# Patient Record
Sex: MALE | Race: WHITE | NOT HISPANIC OR LATINO | Employment: FULL TIME | URBAN - NONMETROPOLITAN AREA
[De-identification: names, ages, dates, MRNs, and addresses within clinical notes are randomized per-mention and may not be internally consistent; named-entity substitution may affect disease eponyms.]

---

## 2017-12-23 ENCOUNTER — HOSPITAL ENCOUNTER (EMERGENCY)
Facility: HOSPITAL | Age: 27
Discharge: HOME | End: 2017-12-23
Admitting: NURSE PRACTITIONER

## 2017-12-23 ENCOUNTER — APPOINTMENT (OUTPATIENT)
Dept: CT IMAGING | Facility: HOSPITAL | Age: 27
End: 2017-12-23

## 2017-12-23 VITALS
HEART RATE: 78 BPM | BODY MASS INDEX: 44.3 KG/M2 | WEIGHT: 250 LBS | DIASTOLIC BLOOD PRESSURE: 73 MMHG | HEIGHT: 63 IN | SYSTOLIC BLOOD PRESSURE: 136 MMHG | RESPIRATION RATE: 20 BRPM | OXYGEN SATURATION: 96 % | TEMPERATURE: 97.2 F

## 2017-12-23 DIAGNOSIS — N20.0 RIGHT KIDNEY STONE: Primary | ICD-10-CM

## 2017-12-23 LAB
ALBUMIN SERPL-MCNC: 4.3 G/DL (ref 3.5–5)
ALBUMIN/GLOB SERPL: 1.3 G/DL (ref 1.1–2.5)
ALP SERPL-CCNC: 83 U/L (ref 24–120)
ALT SERPL W P-5'-P-CCNC: 79 U/L (ref 0–54)
AMYLASE SERPL-CCNC: 34 U/L (ref 30–110)
ANION GAP SERPL CALCULATED.3IONS-SCNC: 16 MMOL/L (ref 4–13)
AST SERPL-CCNC: 42 U/L (ref 7–45)
BACTERIA UR QL AUTO: ABNORMAL /HPF
BASOPHILS # BLD AUTO: 0.03 10*3/MM3 (ref 0–0.2)
BASOPHILS NFR BLD AUTO: 0.4 % (ref 0–2)
BILIRUB SERPL-MCNC: 0.4 MG/DL (ref 0.1–1)
BILIRUB UR QL STRIP: NEGATIVE
BUN BLD-MCNC: 16 MG/DL (ref 5–21)
BUN/CREAT SERPL: 16.2 (ref 7–25)
CALCIUM SPEC-SCNC: 8.6 MG/DL (ref 8.4–10.4)
CHLORIDE SERPL-SCNC: 106 MMOL/L (ref 98–110)
CLARITY UR: CLEAR
CO2 SERPL-SCNC: 22 MMOL/L (ref 24–31)
COLOR UR: ABNORMAL
CREAT BLD-MCNC: 0.99 MG/DL (ref 0.5–1.4)
DEPRECATED RDW RBC AUTO: 39.3 FL (ref 40–54)
EOSINOPHIL # BLD AUTO: 0.15 10*3/MM3 (ref 0–0.7)
EOSINOPHIL NFR BLD AUTO: 1.9 % (ref 0–4)
ERYTHROCYTE [DISTWIDTH] IN BLOOD BY AUTOMATED COUNT: 13.1 % (ref 12–15)
GFR SERPL CREATININE-BSD FRML MDRD: 110 ML/MIN/1.73
GFR SERPL CREATININE-BSD FRML MDRD: 91 ML/MIN/1.73
GLOBULIN UR ELPH-MCNC: 3.3 GM/DL
GLUCOSE BLD-MCNC: 152 MG/DL (ref 70–100)
GLUCOSE UR STRIP-MCNC: NEGATIVE MG/DL
HCT VFR BLD AUTO: 41 % (ref 40–52)
HGB BLD-MCNC: 14 G/DL (ref 14–18)
HGB UR QL STRIP.AUTO: ABNORMAL
HOLD SPECIMEN: NORMAL
HOLD SPECIMEN: NORMAL
HYALINE CASTS UR QL AUTO: ABNORMAL /LPF
IMM GRANULOCYTES # BLD: 0.01 10*3/MM3 (ref 0–0.03)
IMM GRANULOCYTES NFR BLD: 0.1 % (ref 0–5)
KETONES UR QL STRIP: ABNORMAL
LEUKOCYTE ESTERASE UR QL STRIP.AUTO: NEGATIVE
LIPASE SERPL-CCNC: 63 U/L (ref 23–203)
LYMPHOCYTES # BLD AUTO: 1.59 10*3/MM3 (ref 0.72–4.86)
LYMPHOCYTES NFR BLD AUTO: 20.1 % (ref 15–45)
MCH RBC QN AUTO: 28.7 PG (ref 28–32)
MCHC RBC AUTO-ENTMCNC: 34.1 G/DL (ref 33–36)
MCV RBC AUTO: 84 FL (ref 82–95)
MONOCYTES # BLD AUTO: 0.86 10*3/MM3 (ref 0.19–1.3)
MONOCYTES NFR BLD AUTO: 10.8 % (ref 4–12)
NEUTROPHILS # BLD AUTO: 5.29 10*3/MM3 (ref 1.87–8.4)
NEUTROPHILS NFR BLD AUTO: 66.7 % (ref 39–78)
NITRITE UR QL STRIP: NEGATIVE
PH UR STRIP.AUTO: <=5 [PH] (ref 5–8)
PLATELET # BLD AUTO: 258 10*3/MM3 (ref 130–400)
PMV BLD AUTO: 10.3 FL (ref 6–12)
POTASSIUM BLD-SCNC: 3.5 MMOL/L (ref 3.5–5.3)
PROT SERPL-MCNC: 7.6 G/DL (ref 6.3–8.7)
PROT UR QL STRIP: ABNORMAL
RBC # BLD AUTO: 4.88 10*6/MM3 (ref 4.8–5.9)
RBC # UR: ABNORMAL /HPF
REF LAB TEST METHOD: ABNORMAL
SODIUM BLD-SCNC: 144 MMOL/L (ref 135–145)
SP GR UR STRIP: >=1.03 (ref 1–1.03)
SQUAMOUS #/AREA URNS HPF: ABNORMAL /HPF
UROBILINOGEN UR QL STRIP: ABNORMAL
WBC NRBC COR # BLD: 7.93 10*3/MM3 (ref 4.8–10.8)
WBC UR QL AUTO: ABNORMAL /HPF
WHOLE BLOOD HOLD SPECIMEN: NORMAL
WHOLE BLOOD HOLD SPECIMEN: NORMAL

## 2017-12-23 PROCEDURE — 25010000002 HYDROMORPHONE PER 4 MG: Performed by: NURSE PRACTITIONER

## 2017-12-23 PROCEDURE — 83690 ASSAY OF LIPASE: CPT

## 2017-12-23 PROCEDURE — 25010000002 ONDANSETRON PER 1 MG

## 2017-12-23 PROCEDURE — 25010000002 KETOROLAC TROMETHAMINE PER 15 MG: Performed by: NURSE PRACTITIONER

## 2017-12-23 PROCEDURE — 96374 THER/PROPH/DIAG INJ IV PUSH: CPT

## 2017-12-23 PROCEDURE — 99283 EMERGENCY DEPT VISIT LOW MDM: CPT

## 2017-12-23 PROCEDURE — 85025 COMPLETE CBC W/AUTO DIFF WBC: CPT

## 2017-12-23 PROCEDURE — 96375 TX/PRO/DX INJ NEW DRUG ADDON: CPT

## 2017-12-23 PROCEDURE — 81001 URINALYSIS AUTO W/SCOPE: CPT | Performed by: NURSE PRACTITIONER

## 2017-12-23 PROCEDURE — 80053 COMPREHEN METABOLIC PANEL: CPT

## 2017-12-23 PROCEDURE — 82150 ASSAY OF AMYLASE: CPT

## 2017-12-23 PROCEDURE — 74176 CT ABD & PELVIS W/O CONTRAST: CPT

## 2017-12-23 RX ORDER — HYDROCODONE BITARTRATE AND ACETAMINOPHEN 7.5; 325 MG/1; MG/1
1 TABLET ORAL EVERY 6 HOURS PRN
Qty: 12 TABLET | Refills: 0 | Status: SHIPPED | OUTPATIENT
Start: 2017-12-23 | End: 2017-12-26

## 2017-12-23 RX ORDER — TAMSULOSIN HYDROCHLORIDE 0.4 MG/1
1 CAPSULE ORAL DAILY
Qty: 30 CAPSULE | Refills: 0 | Status: SHIPPED | OUTPATIENT
Start: 2017-12-23

## 2017-12-23 RX ORDER — KETOROLAC TROMETHAMINE 10 MG/1
10 TABLET, FILM COATED ORAL EVERY 6 HOURS PRN
Qty: 12 TABLET | Refills: 0 | Status: SHIPPED | OUTPATIENT
Start: 2017-12-23 | End: 2017-12-26

## 2017-12-23 RX ORDER — ONDANSETRON 2 MG/ML
8 INJECTION INTRAMUSCULAR; INTRAVENOUS ONCE
Status: COMPLETED | OUTPATIENT
Start: 2017-12-23 | End: 2017-12-23

## 2017-12-23 RX ORDER — KETOROLAC TROMETHAMINE 30 MG/ML
30 INJECTION, SOLUTION INTRAMUSCULAR; INTRAVENOUS EVERY 6 HOURS PRN
Status: DISCONTINUED | OUTPATIENT
Start: 2017-12-23 | End: 2017-12-23 | Stop reason: HOSPADM

## 2017-12-23 RX ORDER — ONDANSETRON 2 MG/ML
INJECTION INTRAMUSCULAR; INTRAVENOUS
Status: COMPLETED
Start: 2017-12-23 | End: 2017-12-23

## 2017-12-23 RX ADMIN — ONDANSETRON 8 MG: 2 INJECTION, SOLUTION INTRAMUSCULAR; INTRAVENOUS at 11:45

## 2017-12-23 RX ADMIN — KETOROLAC TROMETHAMINE 30 MG: 30 INJECTION, SOLUTION INTRAMUSCULAR at 12:51

## 2017-12-23 RX ADMIN — ONDANSETRON 8 MG: 2 INJECTION INTRAMUSCULAR; INTRAVENOUS at 11:45

## 2017-12-23 RX ADMIN — HYDROMORPHONE HYDROCHLORIDE 1 MG: 1 INJECTION, SOLUTION INTRAMUSCULAR; INTRAVENOUS; SUBCUTANEOUS at 11:59

## 2017-12-26 DIAGNOSIS — R10.9 FLANK PAIN: Primary | ICD-10-CM

## 2017-12-29 ENCOUNTER — OFFICE VISIT (OUTPATIENT)
Dept: UROLOGY | Facility: CLINIC | Age: 27
End: 2017-12-29

## 2017-12-29 ENCOUNTER — HOSPITAL ENCOUNTER (OUTPATIENT)
Dept: GENERAL RADIOLOGY | Facility: HOSPITAL | Age: 27
Discharge: HOME | End: 2017-12-29
Attending: UROLOGY | Admitting: UROLOGY

## 2017-12-29 VITALS — DIASTOLIC BLOOD PRESSURE: 66 MMHG | TEMPERATURE: 96.5 F | SYSTOLIC BLOOD PRESSURE: 115 MMHG | HEIGHT: 63 IN

## 2017-12-29 DIAGNOSIS — R10.9 FLANK PAIN: ICD-10-CM

## 2017-12-29 DIAGNOSIS — N20.1 RIGHT URETERAL STONE: Primary | ICD-10-CM

## 2017-12-29 LAB
BILIRUB BLD-MCNC: NEGATIVE MG/DL
CLARITY, POC: CLEAR
COLOR UR: YELLOW
GLUCOSE UR STRIP-MCNC: NEGATIVE MG/DL
KETONES UR QL: NEGATIVE
LEUKOCYTE EST, POC: NEGATIVE
NITRITE UR-MCNC: NEGATIVE MG/ML
PH UR: 5 [PH] (ref 5–8)
PROT UR STRIP-MCNC: ABNORMAL MG/DL
RBC # UR STRIP: NEGATIVE /UL
SP GR UR: 1.02 (ref 1–1.03)
UROBILINOGEN UR QL: NORMAL

## 2017-12-29 PROCEDURE — 74000 HC ABDOMEN KUB: CPT

## 2017-12-29 PROCEDURE — 81003 URINALYSIS AUTO W/O SCOPE: CPT | Performed by: UROLOGY

## 2017-12-29 PROCEDURE — 99204 OFFICE O/P NEW MOD 45 MIN: CPT | Performed by: UROLOGY

## 2017-12-29 RX ORDER — HYDROCODONE BITARTRATE AND ACETAMINOPHEN 7.5; 325 MG/1; MG/1
1 TABLET ORAL EVERY 6 HOURS PRN
COMMUNITY

## 2017-12-29 RX ORDER — KETOROLAC TROMETHAMINE 10 MG/1
10 TABLET, FILM COATED ORAL EVERY 6 HOURS PRN
COMMUNITY

## 2017-12-29 NOTE — PROGRESS NOTES
Mr. Jimenez is 27 y.o. male    Chief Complaint   Patient presents with   • Flank Pain       Flank Pain   This is a new problem. The current episode started in the past 7 days. The problem occurs constantly. The problem has been resolved since onset. Pain location: right flank. Radiates to: right lower pelvis. The pain is moderate. The pain is the same all the time. The symptoms are aggravated by position. Associated symptoms include abdominal pain and headaches. Pertinent negatives include no chest pain, dysuria or fever. Risk factors: no history of stones. He has tried analgesics and NSAIDs for the symptoms. The treatment provided significant relief.       The following portions of the patient's history were reviewed and updated as appropriate: allergies, current medications, past family history, past medical history, past social history, past surgical history and problem list.    Review of Systems   Constitutional: Negative for chills and fever.   HENT: Negative for congestion and sore throat.    Eyes: Negative for pain and itching.   Respiratory: Negative for cough and shortness of breath.    Cardiovascular: Negative for chest pain.   Gastrointestinal: Positive for abdominal pain. Negative for anal bleeding and blood in stool.   Endocrine: Negative for cold intolerance and heat intolerance.   Genitourinary: Negative for difficulty urinating, dysuria, flank pain, frequency, hematuria and urgency.   Musculoskeletal: Negative for back pain and neck pain.   Skin: Negative for color change and rash.   Allergic/Immunologic: Negative for food allergies.   Neurological: Positive for headaches. Negative for dizziness.   Hematological: Does not bruise/bleed easily.   Psychiatric/Behavioral: Negative for confusion and sleep disturbance.         Current Outpatient Prescriptions:   •  HYDROcodone-acetaminophen (NORCO) 7.5-325 MG per tablet, Take 1 tablet by mouth Every 6 (Six) Hours As Needed for Moderate Pain ., Disp: , Rfl:  "  •  ketorolac (TORADOL) 10 MG tablet, Take 10 mg by mouth Every 6 (Six) Hours As Needed for Moderate Pain ., Disp: , Rfl:   •  tamsulosin (FLOMAX) 0.4 MG capsule 24 hr capsule, Take 1 capsule by mouth Daily., Disp: 30 capsule, Rfl: 0    History reviewed. No pertinent past medical history.    History reviewed. No pertinent surgical history.    Social History     Social History   • Marital status: Single     Spouse name: N/A   • Number of children: N/A   • Years of education: N/A     Social History Main Topics   • Smoking status: Never Smoker   • Smokeless tobacco: Never Used   • Alcohol use No   • Drug use: None   • Sexual activity: Not Asked     Other Topics Concern   • None     Social History Narrative       Family History   Problem Relation Age of Onset   • No Known Problems Father    • No Known Problems Mother        Objective    /66  Temp 96.5 °F (35.8 °C)  Ht 160 cm (63\")    Physical Exam   Constitutional: He is oriented to person, place, and time. He appears well-developed and well-nourished. No distress.   HENT:   Head: Normocephalic and atraumatic.   Right Ear: External ear and ear canal normal.   Left Ear: External ear and ear canal normal.   Nose: No nasal deformity. No epistaxis.   Mouth/Throat: Oropharynx is clear and moist. Mucous membranes are not pale, not dry and not cyanotic. Normal dentition. No oropharyngeal exudate.   Neck: Trachea normal. No tracheal tenderness present. No tracheal deviation present. No thyroid mass and no thyromegaly present.   Pulmonary/Chest: Effort normal. No accessory muscle usage. No respiratory distress. Chest wall is not dull to percussion (No flatness or hyperresonance). He exhibits no mass and no tenderness.   On palpation, no tactile fremitus. All movements are symmetric. No intercostal retraction noted.    Abdominal: Soft. Normal appearance. He exhibits no distension and no mass. There is no hepatosplenomegaly. There is no tenderness. No hernia.   Rectal " examination or stool specimen is not indicated.    Musculoskeletal:   Normal gait and station. The spine, ribs, and pelvis are examined. No obvious misalignment or asymmetry. ROM is reasonable for age. No instability. No obvious atrophy, flaccidity or spasticity.    Lymphadenopathy:     He has no cervical adenopathy.        Right: No inguinal adenopathy present.        Left: No inguinal adenopathy present.   Neurological: He is alert and oriented to person, place, and time.   Skin: Skin is warm, dry and intact. No lesion and no rash noted. He is not diaphoretic. No cyanosis. No pallor. Nails show no clubbing.   On palpation, there were no induration, subcutaneous nodules, or tightening   Psychiatric: His speech is normal and behavior is normal. Judgment and thought content normal. His mood appears not anxious. His affect is not labile. He does not exhibit a depressed mood.   Vitals reviewed.      Admission on 12/23/2017, Discharged on 12/23/2017   Component Date Value Ref Range Status   • Glucose 12/23/2017 152* 70 - 100 mg/dL Final   • BUN 12/23/2017 16  5 - 21 mg/dL Final   • Creatinine 12/23/2017 0.99  0.50 - 1.40 mg/dL Final   • Sodium 12/23/2017 144  135 - 145 mmol/L Final   • Potassium 12/23/2017 3.5  3.5 - 5.3 mmol/L Final   • Chloride 12/23/2017 106  98 - 110 mmol/L Final   • CO2 12/23/2017 22.0* 24.0 - 31.0 mmol/L Final   • Calcium 12/23/2017 8.6  8.4 - 10.4 mg/dL Final   • Total Protein 12/23/2017 7.6  6.3 - 8.7 g/dL Final   • Albumin 12/23/2017 4.30  3.50 - 5.00 g/dL Final   • ALT (SGPT) 12/23/2017 79* 0 - 54 U/L Final   • AST (SGOT) 12/23/2017 42  7 - 45 U/L Final   • Alkaline Phosphatase 12/23/2017 83  24 - 120 U/L Final   • Total Bilirubin 12/23/2017 0.4  0.1 - 1.0 mg/dL Final   • eGFR Non  Amer 12/23/2017 91  >60 mL/min/1.73 Final   • eGFR   Amer 12/23/2017 110  >60 mL/min/1.73 Final   • Globulin 12/23/2017 3.3  gm/dL Final   • A/G Ratio 12/23/2017 1.3  1.1 - 2.5 g/dL Final   •  BUN/Creatinine Ratio 12/23/2017 16.2  7.0 - 25.0 Final   • Anion Gap 12/23/2017 16.0* 4.0 - 13.0 mmol/L Final   • Lipase 12/23/2017 63  23 - 203 U/L Final   • Amylase 12/23/2017 34  30 - 110 U/L Final   • WBC 12/23/2017 7.93  4.80 - 10.80 10*3/mm3 Final   • RBC 12/23/2017 4.88  4.80 - 5.90 10*6/mm3 Final   • Hemoglobin 12/23/2017 14.0  14.0 - 18.0 g/dL Final   • Hematocrit 12/23/2017 41.0  40.0 - 52.0 % Final   • MCV 12/23/2017 84.0  82.0 - 95.0 fL Final   • MCH 12/23/2017 28.7  28.0 - 32.0 pg Final   • MCHC 12/23/2017 34.1  33.0 - 36.0 g/dL Final   • RDW 12/23/2017 13.1  12.0 - 15.0 % Final   • RDW-SD 12/23/2017 39.3* 40.0 - 54.0 fl Final   • MPV 12/23/2017 10.3  6.0 - 12.0 fL Final   • Platelets 12/23/2017 258  130 - 400 10*3/mm3 Final   • Neutrophil % 12/23/2017 66.7  39.0 - 78.0 % Final   • Lymphocyte % 12/23/2017 20.1  15.0 - 45.0 % Final   • Monocyte % 12/23/2017 10.8  4.0 - 12.0 % Final   • Eosinophil % 12/23/2017 1.9  0.0 - 4.0 % Final   • Basophil % 12/23/2017 0.4  0.0 - 2.0 % Final   • Immature Grans % 12/23/2017 0.1  0.0 - 5.0 % Final   • Neutrophils, Absolute 12/23/2017 5.29  1.87 - 8.40 10*3/mm3 Final   • Lymphocytes, Absolute 12/23/2017 1.59  0.72 - 4.86 10*3/mm3 Final   • Monocytes, Absolute 12/23/2017 0.86  0.19 - 1.30 10*3/mm3 Final   • Eosinophils, Absolute 12/23/2017 0.15  0.00 - 0.70 10*3/mm3 Final   • Basophils, Absolute 12/23/2017 0.03  0.00 - 0.20 10*3/mm3 Final   • Immature Grans, Absolute 12/23/2017 0.01  0.00 - 0.03 10*3/mm3 Final   • Extra Tube 12/23/2017 hold for add-on   Final    Auto resulted   • Extra Tube 12/23/2017 Hold for add-ons.   Final    Auto resulted.   • Extra Tube 12/23/2017 hold for add-on   Final    Auto resulted   • Extra Tube 12/23/2017 Hold for add-ons.   Final    Auto resulted.   • Color, UA 12/23/2017 Dark Yellow* Yellow, Straw Final   • Appearance, UA 12/23/2017 Clear  Clear Final   • pH, UA 12/23/2017 <=5.0  5.0 - 8.0 Final   • Specific Gravity, UA 12/23/2017  >=1.030  1.005 - 1.030 Final   • Glucose, UA 12/23/2017 Negative  Negative Final   • Ketones, UA 12/23/2017 Trace* Negative Final   • Bilirubin, UA 12/23/2017 Negative  Negative Final   • Blood, UA 12/23/2017 Moderate (2+)* Negative Final   • Protein, UA 12/23/2017 Trace* Negative Final   • Leuk Esterase, UA 12/23/2017 Negative  Negative Final   • Nitrite, UA 12/23/2017 Negative  Negative Final   • Urobilinogen, UA 12/23/2017 1.0 E.U./dL  0.2 - 1.0 E.U./dL Final   • RBC, UA 12/23/2017 21-30* None Seen /HPF Final   • WBC, UA 12/23/2017 0-2* None Seen /HPF Final   • Bacteria, UA 12/23/2017 None Seen  None Seen /HPF Final   • Squamous Epithelial Cells, UA 12/23/2017 None Seen  None Seen, 0-2 /HPF Final   • Hyaline Casts, UA 12/23/2017 0-2  None Seen /LPF Final   • Methodology 12/23/2017 Automated Microscopy   Final       Results for orders placed or performed in visit on 12/29/17   POC Urinalysis Dipstick, Automated   Result Value Ref Range    Color Yellow Yellow, Straw, Dark Yellow, Brook    Clarity, UA Clear Clear    Glucose, UA Negative Negative, 1000 mg/dL (3+) mg/dL    Bilirubin Negative Negative    Ketones, UA Negative Negative    Specific Gravity  1.025 1.005 - 1.030    Blood, UA Negative Negative    pH, Urine 5.0 5.0 - 8.0    Protein, POC Trace (A) Negative mg/dL    Urobilinogen, UA Normal Normal    Leukocytes Negative Negative    Nitrite, UA Negative Negative     Assessment and Plan    Todd was seen today for flank pain.    Diagnoses and all orders for this visit:    Right ureteral stone  -     POC Urinalysis Dipstick, Automated  -     US Renal Bilateral; Future    Flank pain  I reviewed his labs and his CT scan from the emergency room visit.  CT scan revealed a 2 mm distal right ureteral stone.  Creatinine less than 1.  White blood cell count 7.9.  Urinalysis negative nitrite and leukocyte esterase.    Patient's pain has resolved.  No blood in his urinalysis today.  I do believe he likely passed the stone.   I would like to get a renal ultrasound next week to rule out a silent obstruction.  This is his first stone so I do not think he needs a full metabolic workup.  Today we discussed dietary recommendations for stone formers.  I will see him back next week.  He will call with any further issues with pain or any other questions.    CT independent review  The CT scan of the abdomen/pelvis done without contrast is available for me to review.  Treatment recommendations require an independent review.  First I scanned the liver, spleen, and bowel pattern.  The retroperitoneum including the major vessels and lymphatic packages are briefly reviewed.  This film as been reviewed by the radiologist to determine any non urologic abnormalities that are present.  The kidneys are closely inspected for size, symmetry, contour, parenchymal thickness, perinephric reaction, presence of calcifications, and intrarenal dilation of the collecting system.  The ureters are inspected for their course, caliber, and any calcifications.  The bladder is inspected for its thickness, size, and presence of any calcifications.  This scan shows:    The right kidney appears abnormal on this non contrasted CT scan.   Hydronephrosis and hydroureter to the level of a 2 mm stone close to the UVJ    The left kidney appears normal on this non-contrasted CT scan.  The renal parenchymal is norml in thickness.  There are no solid masses or cysts.  There is no hydronephrosis.  There are no stones.      The bladder appears normal on this non-contrasted CT scan.  The bladder appears normal in thickness.  There no masses or stones seen on this exam.

## 2018-01-04 ENCOUNTER — HOSPITAL ENCOUNTER (OUTPATIENT)
Dept: ULTRASOUND IMAGING | Facility: HOSPITAL | Age: 28
End: 2018-01-04
Attending: UROLOGY

## 2018-04-10 ENCOUNTER — OFFICE VISIT (OUTPATIENT)
Dept: FAMILY MEDICINE CLINIC | Age: 28
End: 2018-04-10
Payer: COMMERCIAL

## 2018-04-10 ENCOUNTER — HOSPITAL ENCOUNTER (OUTPATIENT)
Dept: GENERAL RADIOLOGY | Age: 28
Discharge: HOME | End: 2018-04-10
Payer: COMMERCIAL

## 2018-04-10 VITALS
BODY MASS INDEX: 46.95 KG/M2 | TEMPERATURE: 98.5 F | HEART RATE: 82 BPM | DIASTOLIC BLOOD PRESSURE: 76 MMHG | RESPIRATION RATE: 20 BRPM | HEIGHT: 63 IN | OXYGEN SATURATION: 98 % | WEIGHT: 265 LBS | SYSTOLIC BLOOD PRESSURE: 122 MMHG

## 2018-04-10 DIAGNOSIS — M79.671 RIGHT FOOT PAIN: ICD-10-CM

## 2018-04-10 DIAGNOSIS — M79.671 RIGHT FOOT PAIN: Primary | ICD-10-CM

## 2018-04-10 LAB
ALBUMIN SERPL-MCNC: 4.2 G/DL (ref 3.5–5.2)
ALP BLD-CCNC: 83 U/L (ref 40–130)
ALT SERPL-CCNC: 16 U/L (ref 5–41)
ANION GAP SERPL CALCULATED.3IONS-SCNC: 14 MMOL/L (ref 7–19)
AST SERPL-CCNC: 15 U/L (ref 5–40)
BASOPHILS ABSOLUTE: 0 K/UL (ref 0–0.2)
BASOPHILS RELATIVE PERCENT: 0.2 % (ref 0–1)
BILIRUB SERPL-MCNC: <0.2 MG/DL (ref 0.2–1.2)
BUN BLDV-MCNC: 17 MG/DL (ref 6–20)
CALCIUM SERPL-MCNC: 9.2 MG/DL (ref 8.6–10)
CHLORIDE BLD-SCNC: 102 MMOL/L (ref 98–111)
CO2: 25 MMOL/L (ref 22–29)
CREAT SERPL-MCNC: 0.8 MG/DL (ref 0.5–1.2)
EOSINOPHILS ABSOLUTE: 0.3 K/UL (ref 0–0.6)
EOSINOPHILS RELATIVE PERCENT: 5.1 % (ref 0–5)
GFR NON-AFRICAN AMERICAN: >60
GLUCOSE BLD-MCNC: 92 MG/DL (ref 74–109)
HCT VFR BLD CALC: 44.6 % (ref 42–52)
HEMOGLOBIN: 14.2 G/DL (ref 14–18)
LYMPHOCYTES ABSOLUTE: 1.2 K/UL (ref 1.1–4.5)
LYMPHOCYTES RELATIVE PERCENT: 21.9 % (ref 20–40)
MCH RBC QN AUTO: 28.2 PG (ref 27–31)
MCHC RBC AUTO-ENTMCNC: 31.8 G/DL (ref 33–37)
MCV RBC AUTO: 88.5 FL (ref 80–94)
MONOCYTES ABSOLUTE: 0.6 K/UL (ref 0–0.9)
MONOCYTES RELATIVE PERCENT: 10.8 % (ref 0–10)
NEUTROPHILS ABSOLUTE: 3.4 K/UL (ref 1.5–7.5)
NEUTROPHILS RELATIVE PERCENT: 61.8 % (ref 50–65)
PDW BLD-RTO: 13.2 % (ref 11.5–14.5)
PLATELET # BLD: 273 K/UL (ref 130–400)
PMV BLD AUTO: 11.6 FL (ref 9.4–12.4)
POTASSIUM SERPL-SCNC: 4.5 MMOL/L (ref 3.5–5)
RBC # BLD: 5.04 M/UL (ref 4.7–6.1)
SEDIMENTATION RATE, ERYTHROCYTE: 21 MM/HR (ref 0–10)
SODIUM BLD-SCNC: 141 MMOL/L (ref 136–145)
TOTAL PROTEIN: 7.3 G/DL (ref 6.6–8.7)
URIC ACID, SERUM: 7 MG/DL (ref 3.4–7)
WBC # BLD: 5.5 K/UL (ref 4.8–10.8)

## 2018-04-10 PROCEDURE — 96372 THER/PROPH/DIAG INJ SC/IM: CPT | Performed by: NURSE PRACTITIONER

## 2018-04-10 PROCEDURE — 99203 OFFICE O/P NEW LOW 30 MIN: CPT | Performed by: NURSE PRACTITIONER

## 2018-04-10 PROCEDURE — 73630 X-RAY EXAM OF FOOT: CPT

## 2018-04-10 RX ORDER — TRIAMCINOLONE ACETONIDE 40 MG/ML
40 INJECTION, SUSPENSION INTRA-ARTICULAR; INTRAMUSCULAR ONCE
Status: COMPLETED | OUTPATIENT
Start: 2018-04-10 | End: 2018-04-10

## 2018-04-10 RX ORDER — DEXAMETHASONE SODIUM PHOSPHATE 4 MG/ML
4 INJECTION, SOLUTION INTRA-ARTICULAR; INTRALESIONAL; INTRAMUSCULAR; INTRAVENOUS; SOFT TISSUE ONCE
Status: COMPLETED | OUTPATIENT
Start: 2018-04-10 | End: 2018-04-10

## 2018-04-10 RX ORDER — METHYLPREDNISOLONE 4 MG/1
TABLET ORAL
Qty: 1 KIT | Refills: 0 | Status: SHIPPED | OUTPATIENT
Start: 2018-04-10 | End: 2018-04-16

## 2018-04-10 RX ADMIN — DEXAMETHASONE SODIUM PHOSPHATE 4 MG: 4 INJECTION, SOLUTION INTRA-ARTICULAR; INTRALESIONAL; INTRAMUSCULAR; INTRAVENOUS; SOFT TISSUE at 13:47

## 2018-04-10 RX ADMIN — TRIAMCINOLONE ACETONIDE 40 MG: 40 INJECTION, SUSPENSION INTRA-ARTICULAR; INTRAMUSCULAR at 13:48

## 2018-04-10 ASSESSMENT — ENCOUNTER SYMPTOMS
ABDOMINAL PAIN: 0
SWOLLEN GLANDS: 0
TROUBLE SWALLOWING: 0
SHORTNESS OF BREATH: 0
NAUSEA: 0
VOMITING: 0
CONSTIPATION: 0
BLOOD IN STOOL: 0

## 2018-04-24 ENCOUNTER — OFFICE VISIT (OUTPATIENT)
Dept: FAMILY MEDICINE CLINIC | Age: 28
End: 2018-04-24
Payer: COMMERCIAL

## 2018-04-24 VITALS
WEIGHT: 265 LBS | OXYGEN SATURATION: 97 % | DIASTOLIC BLOOD PRESSURE: 72 MMHG | TEMPERATURE: 98.6 F | HEART RATE: 70 BPM | SYSTOLIC BLOOD PRESSURE: 118 MMHG | BODY MASS INDEX: 46.94 KG/M2

## 2018-04-24 DIAGNOSIS — M10.9 ACUTE GOUT OF RIGHT FOOT, UNSPECIFIED CAUSE: Primary | ICD-10-CM

## 2018-04-24 PROCEDURE — 99213 OFFICE O/P EST LOW 20 MIN: CPT | Performed by: NURSE PRACTITIONER

## 2018-04-24 RX ORDER — ALLOPURINOL 100 MG/1
100 TABLET ORAL DAILY
Qty: 30 TABLET | Refills: 5 | Status: SHIPPED | OUTPATIENT
Start: 2018-04-24

## 2018-04-24 ASSESSMENT — PATIENT HEALTH QUESTIONNAIRE - PHQ9
2. FEELING DOWN, DEPRESSED OR HOPELESS: 0
SUM OF ALL RESPONSES TO PHQ QUESTIONS 1-9: 0
1. LITTLE INTEREST OR PLEASURE IN DOING THINGS: 0
SUM OF ALL RESPONSES TO PHQ9 QUESTIONS 1 & 2: 0

## 2024-04-28 ENCOUNTER — APPOINTMENT (OUTPATIENT)
Dept: ULTRASOUND IMAGING | Facility: HOSPITAL | Age: 34
End: 2024-04-28
Payer: COMMERCIAL

## 2024-04-28 ENCOUNTER — HOSPITAL ENCOUNTER (EMERGENCY)
Facility: HOSPITAL | Age: 34
Discharge: HOME | End: 2024-04-28
Attending: STUDENT IN AN ORGANIZED HEALTH CARE EDUCATION/TRAINING PROGRAM | Admitting: STUDENT IN AN ORGANIZED HEALTH CARE EDUCATION/TRAINING PROGRAM
Payer: COMMERCIAL

## 2024-04-28 VITALS
RESPIRATION RATE: 20 BRPM | DIASTOLIC BLOOD PRESSURE: 79 MMHG | OXYGEN SATURATION: 99 % | HEIGHT: 63 IN | WEIGHT: 285 LBS | TEMPERATURE: 98.5 F | HEART RATE: 77 BPM | SYSTOLIC BLOOD PRESSURE: 135 MMHG | BODY MASS INDEX: 50.5 KG/M2

## 2024-04-28 DIAGNOSIS — R10.13 ABDOMINAL PAIN, EPIGASTRIC: Primary | ICD-10-CM

## 2024-04-28 LAB
ALBUMIN SERPL-MCNC: 4.4 G/DL (ref 3.5–5.2)
ALBUMIN/GLOB SERPL: 1.2 G/DL
ALP SERPL-CCNC: 82 U/L (ref 39–117)
ALT SERPL W P-5'-P-CCNC: 24 U/L (ref 1–41)
ANION GAP SERPL CALCULATED.3IONS-SCNC: 13 MMOL/L (ref 5–15)
AST SERPL-CCNC: 18 U/L (ref 1–40)
BASOPHILS # BLD AUTO: 0.03 10*3/MM3 (ref 0–0.2)
BASOPHILS NFR BLD AUTO: 0.3 % (ref 0–1.5)
BILIRUB SERPL-MCNC: 0.3 MG/DL (ref 0–1.2)
BUN SERPL-MCNC: 12 MG/DL (ref 6–20)
BUN/CREAT SERPL: 14.8 (ref 7–25)
CALCIUM SPEC-SCNC: 9.7 MG/DL (ref 8.6–10.5)
CHLORIDE SERPL-SCNC: 101 MMOL/L (ref 98–107)
CO2 SERPL-SCNC: 24 MMOL/L (ref 22–29)
CREAT SERPL-MCNC: 0.81 MG/DL (ref 0.76–1.27)
DEPRECATED RDW RBC AUTO: 41.4 FL (ref 37–54)
EGFRCR SERPLBLD CKD-EPI 2021: 119.4 ML/MIN/1.73
EOSINOPHIL # BLD AUTO: 0.08 10*3/MM3 (ref 0–0.4)
EOSINOPHIL NFR BLD AUTO: 0.7 % (ref 0.3–6.2)
ERYTHROCYTE [DISTWIDTH] IN BLOOD BY AUTOMATED COUNT: 13.3 % (ref 12.3–15.4)
GLOBULIN UR ELPH-MCNC: 3.7 GM/DL
GLUCOSE SERPL-MCNC: 118 MG/DL (ref 65–99)
HCT VFR BLD AUTO: 44.1 % (ref 37.5–51)
HGB BLD-MCNC: 14.8 G/DL (ref 13–17.7)
HOLD SPECIMEN: NORMAL
HOLD SPECIMEN: NORMAL
IMM GRANULOCYTES # BLD AUTO: 0.03 10*3/MM3 (ref 0–0.05)
IMM GRANULOCYTES NFR BLD AUTO: 0.3 % (ref 0–0.5)
LIPASE SERPL-CCNC: 16 U/L (ref 13–60)
LYMPHOCYTES # BLD AUTO: 1.4 10*3/MM3 (ref 0.7–3.1)
LYMPHOCYTES NFR BLD AUTO: 13.1 % (ref 19.6–45.3)
MAGNESIUM SERPL-MCNC: 1.9 MG/DL (ref 1.6–2.6)
MCH RBC QN AUTO: 28.5 PG (ref 26.6–33)
MCHC RBC AUTO-ENTMCNC: 33.6 G/DL (ref 31.5–35.7)
MCV RBC AUTO: 84.8 FL (ref 79–97)
MONOCYTES # BLD AUTO: 0.67 10*3/MM3 (ref 0.1–0.9)
MONOCYTES NFR BLD AUTO: 6.3 % (ref 5–12)
NEUTROPHILS NFR BLD AUTO: 79.3 % (ref 42.7–76)
NEUTROPHILS NFR BLD AUTO: 8.48 10*3/MM3 (ref 1.7–7)
NRBC BLD AUTO-RTO: 0 /100 WBC (ref 0–0.2)
PLATELET # BLD AUTO: 318 10*3/MM3 (ref 140–450)
PMV BLD AUTO: 10.3 FL (ref 6–12)
POTASSIUM SERPL-SCNC: 4.5 MMOL/L (ref 3.5–5.2)
PROT SERPL-MCNC: 8.1 G/DL (ref 6–8.5)
RBC # BLD AUTO: 5.2 10*6/MM3 (ref 4.14–5.8)
SODIUM SERPL-SCNC: 138 MMOL/L (ref 136–145)
WBC NRBC COR # BLD AUTO: 10.69 10*3/MM3 (ref 3.4–10.8)
WHOLE BLOOD HOLD COAG: NORMAL
WHOLE BLOOD HOLD SPECIMEN: NORMAL

## 2024-04-28 PROCEDURE — 93010 ELECTROCARDIOGRAM REPORT: CPT | Performed by: INTERNAL MEDICINE

## 2024-04-28 PROCEDURE — 83735 ASSAY OF MAGNESIUM: CPT | Performed by: STUDENT IN AN ORGANIZED HEALTH CARE EDUCATION/TRAINING PROGRAM

## 2024-04-28 PROCEDURE — 25810000003 LACTATED RINGERS SOLUTION: Performed by: STUDENT IN AN ORGANIZED HEALTH CARE EDUCATION/TRAINING PROGRAM

## 2024-04-28 PROCEDURE — 25010000002 MORPHINE PER 10 MG: Performed by: STUDENT IN AN ORGANIZED HEALTH CARE EDUCATION/TRAINING PROGRAM

## 2024-04-28 PROCEDURE — 99284 EMERGENCY DEPT VISIT MOD MDM: CPT

## 2024-04-28 PROCEDURE — 83690 ASSAY OF LIPASE: CPT | Performed by: STUDENT IN AN ORGANIZED HEALTH CARE EDUCATION/TRAINING PROGRAM

## 2024-04-28 PROCEDURE — 76705 ECHO EXAM OF ABDOMEN: CPT

## 2024-04-28 PROCEDURE — 93005 ELECTROCARDIOGRAM TRACING: CPT | Performed by: STUDENT IN AN ORGANIZED HEALTH CARE EDUCATION/TRAINING PROGRAM

## 2024-04-28 PROCEDURE — 96374 THER/PROPH/DIAG INJ IV PUSH: CPT

## 2024-04-28 PROCEDURE — 85025 COMPLETE CBC W/AUTO DIFF WBC: CPT | Performed by: STUDENT IN AN ORGANIZED HEALTH CARE EDUCATION/TRAINING PROGRAM

## 2024-04-28 PROCEDURE — 96375 TX/PRO/DX INJ NEW DRUG ADDON: CPT

## 2024-04-28 PROCEDURE — 80053 COMPREHEN METABOLIC PANEL: CPT | Performed by: STUDENT IN AN ORGANIZED HEALTH CARE EDUCATION/TRAINING PROGRAM

## 2024-04-28 RX ORDER — DICYCLOMINE HCL 20 MG
20 TABLET ORAL EVERY 6 HOURS
COMMUNITY
Start: 2024-04-28 | End: 2024-04-28

## 2024-04-28 RX ORDER — DICYCLOMINE HCL 20 MG
20 TABLET ORAL EVERY 8 HOURS PRN
Qty: 30 TABLET | Refills: 0 | Status: SHIPPED | OUTPATIENT
Start: 2024-04-28

## 2024-04-28 RX ORDER — MAGNESIUM HYDROXIDE/ALUMINUM HYDROXICE/SIMETHICONE 120; 1200; 1200 MG/30ML; MG/30ML; MG/30ML
10 SUSPENSION ORAL ONCE
Status: DISCONTINUED | OUTPATIENT
Start: 2024-04-28 | End: 2024-04-28 | Stop reason: HOSPADM

## 2024-04-28 RX ORDER — FAMOTIDINE 10 MG/ML
20 INJECTION, SOLUTION INTRAVENOUS ONCE
Status: COMPLETED | OUTPATIENT
Start: 2024-04-28 | End: 2024-04-28

## 2024-04-28 RX ORDER — OMEPRAZOLE 40 MG/1
40 CAPSULE, DELAYED RELEASE ORAL DAILY
COMMUNITY
Start: 2024-04-28

## 2024-04-28 RX ORDER — FAMOTIDINE 20 MG/1
20 TABLET, FILM COATED ORAL 2 TIMES DAILY
Qty: 28 TABLET | Refills: 0 | Status: SHIPPED | OUTPATIENT
Start: 2024-04-28 | End: 2024-05-12

## 2024-04-28 RX ADMIN — FAMOTIDINE 20 MG: 10 INJECTION INTRAVENOUS at 14:08

## 2024-04-28 RX ADMIN — MORPHINE SULFATE 4 MG: 4 INJECTION, SOLUTION INTRAMUSCULAR; INTRAVENOUS at 13:58

## 2024-04-28 RX ADMIN — SODIUM CHLORIDE, POTASSIUM CHLORIDE, SODIUM LACTATE AND CALCIUM CHLORIDE 1000 ML: 600; 310; 30; 20 INJECTION, SOLUTION INTRAVENOUS at 13:57

## 2024-04-28 NOTE — DISCHARGE INSTRUCTIONS
Please return if you have worsening abdominal pain, lightheadedness, passing out, inability to tolerate food or water, or other emergent concerns. Otherwise please follow-up with your primary care doctor regarding your ER visit today to consider MRCP.    Take the pepcid twice a day to help with reflux pains.  Try the bentyl as needed for abdominal pain.

## 2024-04-28 NOTE — ED PROVIDER NOTES
Subjective   History of Present Illness  Patient presents due to abdominal pain.  He has had a problem with abdominal pain for years.  Feels like a gas pain in his upper abdomen.  Comes and goes.  Has been worse and almost constant since Thursday.  Nausea, no vomiting.  Decreased stooling because he has a decreased appetite because eating makes it worse.  Normal urination no dysuria.  No history of GERD treatment.  Does have a history of gout.  Does not take any daily medications.  Went to [facility] yesterday where Dr. Pace got a CT scan; CT scan was read by the radiologist as normal gallbladder but the patient says Dr. Pace told him his gallbladder is enlarged and he recommended that he get an ultrasound and MRCP.  Patient was going to either go to his doctor tomorrow for an ultrasound and MRCP or come to the ER today; he had continued pain today deciding to come in.  No fevers. No abdominal surgeries.    Review of Systems   Constitutional:  Negative for chills and fever.   Respiratory:  Negative for cough and shortness of breath.    Cardiovascular:  Negative for chest pain and palpitations.   Gastrointestinal:  Positive for abdominal pain and nausea. Negative for vomiting.   Genitourinary:  Negative for difficulty urinating and dysuria.   Neurological:  Negative for syncope and light-headedness.       Past Medical History:   Diagnosis Date    Gout     Kidney stones        Allergies   Allergen Reactions    Penicillins        History reviewed. No pertinent surgical history.    Family History   Problem Relation Age of Onset    No Known Problems Father     No Known Problems Mother        Social History     Socioeconomic History    Marital status: Single   Tobacco Use    Smoking status: Never    Smokeless tobacco: Never   Substance and Sexual Activity    Alcohol use: No           Objective   Physical Exam  Vitals reviewed.   Constitutional:       General: He is not in acute distress.  HENT:      Head:  Normocephalic and atraumatic.   Eyes:      Extraocular Movements: Extraocular movements intact.      Conjunctiva/sclera: Conjunctivae normal.   Cardiovascular:      Pulses: Normal pulses.      Heart sounds: Normal heart sounds.   Pulmonary:      Effort: Pulmonary effort is normal. No respiratory distress.      Breath sounds: Normal breath sounds. No wheezing.   Abdominal:      General: Abdomen is flat. There is no distension.      Tenderness: There is abdominal tenderness in the right upper quadrant and epigastric area. Negative signs include Hsieh's sign.   Musculoskeletal:      Cervical back: Normal range of motion and neck supple.   Skin:     General: Skin is warm and dry.   Neurological:      General: No focal deficit present.      Mental Status: He is alert. Mental status is at baseline.   Psychiatric:         Behavior: Behavior normal.         Thought Content: Thought content normal.         Procedures           ED Course  ED Course as of 04/28/24 1623   Sun Apr 28, 2024   1423 -Laboratory studies reviewed by me and are notable for no focal abnormality. [AS]      ED Course User Index  [AS] Antione Jacobsen MD                                             Medical Decision Making  Problems Addressed:  Abdominal pain, epigastric: complicated acute illness or injury    Amount and/or Complexity of Data Reviewed  Labs: ordered.  Radiology: ordered.  ECG/medicine tests: ordered.    Risk  OTC drugs.  Prescription drug management.      Todd Jimenez is a 33 y.o. male who presented to the ED with abdominal pain. The patient's history and physical are consistent with gastritis, PUD, GERD, less likely cholecystitis. Plan is to evaluate with labs and US. CT report from yesterday is normal no need for repeat.    Appendicitis unlikely given the lack of RLQ pain worse compared to other sites, no rebound tenderness.  Intussusception unlikely given lack of brief, intense episodes of pain, lack of  hematochezia.  Mesenteric ischemia unlikely given abdominal tenderness on exam, no distention, pain is not out of proportion with abdominal exam.  Volvulus unlikely given VSS, no peritonitic signs, no distention.  Bowel obstruction unlikely given pt reports BM, no recent surgical history.  Pancreatitis unlikely as the patient has a normal lipase.  Diverticulitis less likely given the lack of change in bowel habits, focal LLQ abdominal pain.  No hernia palpated on exam.  Inflammatory Bowel Disease unlikely given pain is not episodic, no history of autoimmune disease, no recurrent diarrhea or bloody diarrhea on history.  Abdominal aortic aneurysm unlikely as VSS, no hypotension, no palpable abdominal mass.    Pyelonephritis or UTI unlikely given lack of fever, no dysuria, urgency, or other UTI symptoms.   Ureterolithiasis unlikely given pain is not colicky, pt has abdominal tenderness on exam, no h/o nephrolithiasis.    ED Course:  -Ultrasound negative for stones or cholecystitis  -On re-evaluation, the patient has had drastic improvement of his symptoms.  MRCP would be further reasonable nonemergent workup.  Do not think the patient needs other emergency testing at this time.  He should also trial Pepcid in the outpatient setting to see if it treats gastritis and if he improves.  He will follow-up with his primary care doctor for MRCP.    No further workup indicated at this time. Patient is stable and appropriate for discharge. Patient received strict return precautions including worsening abdominal pain, lightheadedness, passing out, inability to tolerate food or water, and was instructed to follow-up with their primary care provider regarding their ER visit.  Final diagnoses:   Abdominal pain, epigastric       ED Disposition  ED Disposition       ED Disposition   Discharge    Condition   Stable    Comment   --               PATIENT CONNECTION - [facility] [id number]  [phone]  Schedule an appointment  as soon as possible for a visit            Medication List        New Prescriptions      famotidine 20 MG tablet  Commonly known as: PEPCID  Take 1 tablet by mouth 2 (Two) Times a Day for 14 days.            Changed      dicyclomine 20 MG tablet  Commonly known as: BENTYL  Take 1 tablet by mouth Every 8 (Eight) Hours As Needed for Abdominal Cramping.  What changed:   when to take this  reasons to take this               Where to Get Your Medications        These medications were sent to [facility] [address] [place] [address] [address] [facility] [phone] [place] [phone] [address] [address]      Phone: [phone]   dicyclomine 20 MG tablet  famotidine 20 MG tablet            Antione Jacobsen MD  04/28/24 1057

## 2024-04-29 LAB
QT INTERVAL: 378 MS
QTC INTERVAL: 425 MS

## 2024-04-30 ENCOUNTER — OFFICE VISIT (OUTPATIENT)
Dept: FAMILY MEDICINE CLINIC | Age: 34
End: 2024-04-30
Payer: COMMERCIAL

## 2024-04-30 VITALS
BODY MASS INDEX: 49.96 KG/M2 | DIASTOLIC BLOOD PRESSURE: 84 MMHG | SYSTOLIC BLOOD PRESSURE: 132 MMHG | HEIGHT: 63 IN | HEART RATE: 92 BPM | OXYGEN SATURATION: 99 % | TEMPERATURE: 97 F | WEIGHT: 282 LBS

## 2024-04-30 DIAGNOSIS — R10.10 PAIN OF UPPER ABDOMEN: Primary | ICD-10-CM

## 2024-04-30 DIAGNOSIS — R11.2 NAUSEA AND VOMITING, UNSPECIFIED VOMITING TYPE: ICD-10-CM

## 2024-04-30 PROCEDURE — 99213 OFFICE O/P EST LOW 20 MIN: CPT | Performed by: NURSE PRACTITIONER

## 2024-04-30 RX ORDER — DICYCLOMINE HCL 20 MG
20 TABLET ORAL EVERY 8 HOURS PRN
COMMUNITY
Start: 2024-04-28

## 2024-04-30 RX ORDER — FAMOTIDINE 20 MG/1
20 TABLET, FILM COATED ORAL 2 TIMES DAILY
COMMUNITY
Start: 2024-04-28 | End: 2024-05-13

## 2024-04-30 SDOH — ECONOMIC STABILITY: FOOD INSECURITY: WITHIN THE PAST 12 MONTHS, THE FOOD YOU BOUGHT JUST DIDN'T LAST AND YOU DIDN'T HAVE MONEY TO GET MORE.: NEVER TRUE

## 2024-04-30 SDOH — ECONOMIC STABILITY: FOOD INSECURITY: WITHIN THE PAST 12 MONTHS, YOU WORRIED THAT YOUR FOOD WOULD RUN OUT BEFORE YOU GOT MONEY TO BUY MORE.: NEVER TRUE

## 2024-04-30 SDOH — ECONOMIC STABILITY: INCOME INSECURITY: HOW HARD IS IT FOR YOU TO PAY FOR THE VERY BASICS LIKE FOOD, HOUSING, MEDICAL CARE, AND HEATING?: NOT HARD AT ALL

## 2024-04-30 SDOH — ECONOMIC STABILITY: HOUSING INSECURITY
IN THE LAST 12 MONTHS, WAS THERE A TIME WHEN YOU DID NOT HAVE A STEADY PLACE TO SLEEP OR SLEPT IN A SHELTER (INCLUDING NOW)?: NO

## 2024-04-30 ASSESSMENT — PATIENT HEALTH QUESTIONNAIRE - PHQ9
SUM OF ALL RESPONSES TO PHQ9 QUESTIONS 1 & 2: 0
SUM OF ALL RESPONSES TO PHQ QUESTIONS 1-9: 0
1. LITTLE INTEREST OR PLEASURE IN DOING THINGS: NOT AT ALL
SUM OF ALL RESPONSES TO PHQ QUESTIONS 1-9: 0
2. FEELING DOWN, DEPRESSED OR HOPELESS: NOT AT ALL
SUM OF ALL RESPONSES TO PHQ QUESTIONS 1-9: 0
SUM OF ALL RESPONSES TO PHQ QUESTIONS 1-9: 0

## 2024-04-30 ASSESSMENT — ENCOUNTER SYMPTOMS
VOMITING: 1
NAUSEA: 1
ABDOMINAL DISTENTION: 1
DIARRHEA: 1
ABDOMINAL PAIN: 1

## 2024-04-30 NOTE — PROGRESS NOTES
SUBJECTIVE:  Went to the ER twice   Patient ID: Kevin Raines is a 33 y.o. male.    HPI:   Abdominal Pain  Associated symptoms include diarrhea, nausea and vomiting. Pertinent negatives include no fever.      Abdominal pain started on Thursday   Symptoms feels like a gas ball   Pain from chest in the abdomen Stabbing unbearable. Comes and goes worsening but a constant dull   Nausea and vomiting. With diarrhea.   Has only happening after eating.   Only eating white rice and Crackers.     ER [place], They did a CT scan Saturday Sunday went to [facility]. They did an US showed and enlarged gallbladder.   I have reviewed Labs from ER     [facility]  Outside Information     US Gallbladder    Anatomical Region Laterality Modality   Body -- Ultrasound   Abdomen -- --     Impression    1. No gallstones or ductal dilatation.  2. Fatty infiltration of the liver.        The images and report are stored on PAC's per institutional and state  guidelines.    This report was signed and finalized on 4/28/2024 3:02 PM by Dr. Thang Raines MD.  Narrative    EXAMINATION:  US GALLBLADDER-  4/28/2024 12:53 PM    HISTORY: Right upper quadrant pain. Tenderness to palpation.    COMPARISON: No comparison study.    TECHNIQUE: Grayscale and color flow ultrasound was performed.    PANCREAS: The pancreas is mostly obscured.    LIVER: There is fatty infiltration of the liver. The portal vein blood  flow direction is normal.    GALLBLADDER: There are no gallstones. There is no gallbladder wall  thickening.    COMMON BILE DUCT: 4.8 mm.    RIGHT KIDNEY: The renal size, cortical thickness and echogenicity are  normal. Limited imaging. There is no hydronephrosis.    OTHER: The abdominal aorta and inferior vena cava are obscured by bowel  gas artifact.  Procedure Note    Thang Raines MD - 04/28/2024  Formatting of this note might be different from the original.  EXAMINATION:  US GALLBLADDER-  4/28/2024 12:53 PM    HISTORY:

## 2024-05-01 ENCOUNTER — TRANSCRIBE ORDERS (OUTPATIENT)
Dept: ADMINISTRATIVE | Facility: HOSPITAL | Age: 34
End: 2024-05-01
Payer: COMMERCIAL

## 2024-05-01 DIAGNOSIS — R10.10 PAIN OF UPPER ABDOMEN: ICD-10-CM

## 2024-05-01 DIAGNOSIS — R11.2 NAUSEA AND VOMITING, UNSPECIFIED VOMITING TYPE: Primary | ICD-10-CM

## 2024-05-02 ENCOUNTER — HOSPITAL ENCOUNTER (OUTPATIENT)
Dept: GENERAL RADIOLOGY | Age: 34
Discharge: HOME | End: 2024-05-02
Payer: COMMERCIAL

## 2024-05-02 ENCOUNTER — TELEPHONE (OUTPATIENT)
Dept: FAMILY MEDICINE CLINIC | Age: 34
End: 2024-05-02

## 2024-05-02 ENCOUNTER — OFFICE VISIT (OUTPATIENT)
Dept: FAMILY MEDICINE CLINIC | Age: 34
End: 2024-05-02
Payer: COMMERCIAL

## 2024-05-02 VITALS
DIASTOLIC BLOOD PRESSURE: 72 MMHG | OXYGEN SATURATION: 98 % | BODY MASS INDEX: 49.6 KG/M2 | SYSTOLIC BLOOD PRESSURE: 120 MMHG | TEMPERATURE: 97 F | HEART RATE: 91 BPM | WEIGHT: 280 LBS

## 2024-05-02 DIAGNOSIS — N50.812 PAIN IN LEFT TESTICLE: Primary | ICD-10-CM

## 2024-05-02 DIAGNOSIS — R11.2 NAUSEA AND VOMITING, UNSPECIFIED VOMITING TYPE: ICD-10-CM

## 2024-05-02 DIAGNOSIS — K40.90 UNILATERAL INGUINAL HERNIA WITHOUT OBSTRUCTION OR GANGRENE, RECURRENCE NOT SPECIFIED: ICD-10-CM

## 2024-05-02 DIAGNOSIS — R10.30 LOWER ABDOMINAL PAIN: ICD-10-CM

## 2024-05-02 DIAGNOSIS — N50.812 PAIN IN LEFT TESTICLE: ICD-10-CM

## 2024-05-02 DIAGNOSIS — R10.10 PAIN OF UPPER ABDOMEN: Primary | ICD-10-CM

## 2024-05-02 LAB
BACTERIA URNS QL MICRO: NEGATIVE /HPF
BILIRUB UR STRIP.AUTO-MCNC: ABNORMAL MG/DL
CLARITY UR: ABNORMAL
COLOR UR: ABNORMAL
CRYSTALS URNS MICRO: ABNORMAL /HPF
EPI CELLS #/AREA URNS AUTO: 2 /HPF (ref 0–5)
GLUCOSE UR STRIP.AUTO-MCNC: NEGATIVE MG/DL
HGB UR STRIP.AUTO-MCNC: NEGATIVE MG/L
HYALINE CASTS #/AREA URNS AUTO: 12 /HPF (ref 0–8)
KETONES UR STRIP.AUTO-MCNC: 15 MG/DL
LEUKOCYTE ESTERASE UR QL STRIP.AUTO: NEGATIVE
NITRITE UR QL STRIP.AUTO: NEGATIVE
PH UR STRIP.AUTO: 5.5 [PH] (ref 5–8)
PROT UR STRIP.AUTO-MCNC: 100 MG/DL
RBC #/AREA URNS AUTO: 1 /HPF (ref 0–4)
SP GR UR STRIP.AUTO: >=1.03 (ref 1–1.03)
URN SPEC COLLECT METH UR: ABNORMAL
UROBILINOGEN UR STRIP.AUTO-MCNC: 0.2 E.U./DL
WBC #/AREA URNS AUTO: 7 /HPF (ref 0–5)

## 2024-05-02 PROCEDURE — 76870 US EXAM SCROTUM: CPT

## 2024-05-02 PROCEDURE — 99213 OFFICE O/P EST LOW 20 MIN: CPT | Performed by: NURSE PRACTITIONER

## 2024-05-02 RX ORDER — DOXYCYCLINE HYCLATE 100 MG
100 TABLET ORAL 2 TIMES DAILY
Qty: 20 TABLET | Refills: 0 | Status: SHIPPED | OUTPATIENT
Start: 2024-05-02 | End: 2024-05-12

## 2024-05-02 ASSESSMENT — ENCOUNTER SYMPTOMS: ABDOMINAL PAIN: 1

## 2024-05-02 NOTE — TELEPHONE ENCOUNTER
Called patient, verified name and date of birth, and gave results in detail as per provider notes below. Patient verbalized understanding and had no further questions.    Patient agrees to additional testing. He requests to go with the MRI. Patient also request general surgery referral to be placed with Dr. Cummins. Orders pended.

## 2024-05-02 NOTE — TELEPHONE ENCOUNTER
----- Message from NATHAN Han sent at 5/2/2024  5:14 PM CDT -----  Ultrasound scrotum and testicles did not reveal any type of torsion cyst the urinalysis was positive for bilirubin I almost would request a repeat CT scan or maybe even an MRI of abdomen

## 2024-05-02 NOTE — PROGRESS NOTES
Urine 05/02/2024 Negative  Negative Final    Comment: Culture Urine under CMS guidelines and criteria will auto reflex on a sole  criteria that is based on manual microscopic count of more than 10/hpf for  WBC. If Culture Urine is warranted aside from this criteria, place a  requisition or order within 24 hours of collection.      Urine Type 05/02/2024 Clean catch   Final        FINDINGS: The right testicle measures 4.1 x 2.0 x 2.9 cm.  The left testicle measures 4.0 x 2.2 x 2.7 cm.  No intratesticular mass.  Normal color Doppler flow within both testicles.  Normal right and left epididymis.     Left inguinal hernia is present.  The hernia sac measures 9.4 x 4.8 x 2.8 cm.  This contains mesenteric fat.  A a portion of bowel extension into the hernia sac is not excluded.     IMPRESSION:  1.  No acute intrascrotal process.  No evidence of torsion or intratesticular mass.  2.  No varicocele or hydrocele.  3.  Left inguinal hernia.  CT could be considered for further characterization.     Past Medical History:   Diagnosis Date    Kidney stone 2017      Prior to Visit Medications    Medication Sig Taking? Authorizing Provider   doxycycline hyclate (VIBRA-TABS) 100 MG tablet Take 1 tablet by mouth 2 times daily for 10 days Yes Katerin Coronado APRN   dicyclomine (BENTYL) 20 MG tablet Take 1 tablet by mouth every 8 hours as needed Yes ProviderStanley MD   famotidine (PEPCID) 20 MG tablet Take 1 tablet by mouth 2 times daily Yes Stanley Kumar MD      Allergies   Allergen Reactions    Penicillins Swelling       Review of Systems   Constitutional:  Negative for fever.   Gastrointestinal:  Positive for abdominal pain.   Genitourinary:  Positive for testicular pain. Negative for difficulty urinating and scrotal swelling.       OBJECTIVE:    Physical Exam  Constitutional:       Appearance: Normal appearance. He is well-developed. He is obese. He is not ill-appearing.   HENT:      Head: Normocephalic and

## 2024-05-02 NOTE — TELEPHONE ENCOUNTER
----- Message from NATHAN Han sent at 5/2/2024  5:16 PM CDT -----  Ultrasound does show the left inguinal hernia but no fluid no masses no variceals no cyst is present  The hernia sac though is fairly large 9.4 cm I think I would probably refer on to general surgery for this

## 2024-05-03 ENCOUNTER — PATIENT MESSAGE (OUTPATIENT)
Dept: FAMILY MEDICINE CLINIC | Age: 34
End: 2024-05-03

## 2024-05-04 LAB
BACTERIA UR CULT: ABNORMAL
BACTERIA UR CULT: ABNORMAL
ORGANISM: ABNORMAL

## 2024-05-06 DIAGNOSIS — R10.10 PAIN OF UPPER ABDOMEN: Primary | ICD-10-CM

## 2024-05-06 DIAGNOSIS — R11.2 NAUSEA AND VOMITING, UNSPECIFIED VOMITING TYPE: ICD-10-CM

## 2024-05-06 DIAGNOSIS — K40.90 UNILATERAL INGUINAL HERNIA WITHOUT OBSTRUCTION OR GANGRENE, RECURRENCE NOT SPECIFIED: ICD-10-CM

## 2024-05-06 RX ORDER — CIPROFLOXACIN 500 MG/1
500 TABLET, FILM COATED ORAL 2 TIMES DAILY
Qty: 20 TABLET | Refills: 0 | Status: SHIPPED | OUTPATIENT
Start: 2024-05-06 | End: 2024-05-16

## 2024-05-10 ENCOUNTER — HOSPITAL ENCOUNTER (OUTPATIENT)
Dept: NUCLEAR MEDICINE | Age: 34
Discharge: HOME | End: 2024-05-12
Payer: COMMERCIAL

## 2024-05-10 DIAGNOSIS — R11.2 NAUSEA AND VOMITING, UNSPECIFIED VOMITING TYPE: ICD-10-CM

## 2024-05-10 DIAGNOSIS — R10.10 PAIN OF UPPER ABDOMEN: ICD-10-CM

## 2024-05-10 PROCEDURE — 78227 HEPATOBIL SYST IMAGE W/DRUG: CPT

## 2024-05-10 PROCEDURE — A9537 TC99M MEBROFENIN: HCPCS | Performed by: NURSE PRACTITIONER

## 2024-05-10 PROCEDURE — 3430000000 HC RX DIAGNOSTIC RADIOPHARMACEUTICAL: Performed by: NURSE PRACTITIONER

## 2024-05-10 RX ADMIN — Medication 5 MILLICURIE: at 10:25

## 2024-05-14 ENCOUNTER — OFFICE VISIT (OUTPATIENT)
Dept: SURGERY | Age: 34
End: 2024-05-14
Payer: COMMERCIAL

## 2024-05-14 VITALS
HEIGHT: 63 IN | HEART RATE: 106 BPM | WEIGHT: 288.6 LBS | OXYGEN SATURATION: 98 % | BODY MASS INDEX: 51.14 KG/M2 | TEMPERATURE: 98.3 F

## 2024-05-14 DIAGNOSIS — K40.90 LEFT INGUINAL HERNIA: Primary | ICD-10-CM

## 2024-05-14 PROCEDURE — 99203 OFFICE O/P NEW LOW 30 MIN: CPT | Performed by: SURGERY

## 2024-05-14 NOTE — PROGRESS NOTES
Mr. Raines is a 33 year old male who presents with a complaint of an inguinal hernia. He states that has been having pain in the groin. He notices is mostly with stretching. The pain goes down to the testicle. He has not palpated a specific bulge. He was seen by PCP and an US was ordered, which did show a left inguinal hernia.    Past Medical History:   Diagnosis Date    Kidney stone 2017     No past surgical history on file.  Current Outpatient Medications   Medication Sig Dispense Refill    ciprofloxacin (CIPRO) 500 MG tablet Take 1 tablet by mouth 2 times daily for 10 days 20 tablet 0    dicyclomine (BENTYL) 20 MG tablet Take 1 tablet by mouth every 8 hours as needed      famotidine (PEPCID) 20 MG tablet Take 1 tablet by mouth 2 times daily       No current facility-administered medications for this visit.     Allergies: Penicillins    Family History   Problem Relation Age of Onset    Diabetes Mother     Drug Abuse Father         back pain       Social History     Tobacco Use    Smoking status: Former    Smokeless tobacco: Former     Types: Chew   Substance Use Topics    Alcohol use: Not Currently       Review of Systems   Constitutional:  Negative for chills and fever.   HENT:  Negative for congestion and sore throat.    Eyes:  Negative for pain and redness.   Respiratory:  Negative for cough and shortness of breath.    Cardiovascular:  Negative for chest pain and palpitations.   Gastrointestinal:  Negative for abdominal distention and abdominal pain.   Genitourinary:  Negative for dysuria and hematuria.   Musculoskeletal:  Negative for arthralgias, back pain and gait problem.   Skin:  Negative for rash and wound.   Neurological:  Negative for dizziness and headaches.   Psychiatric/Behavioral:  Negative for confusion and dysphoric mood.        Physical Exam  Vitals reviewed. Exam conducted with a chaperone present.   Constitutional:       General: He is not in acute distress.     Appearance: He is obese.   HENT:  Standing/Walking/Toileting/Moving from bed to chair

## 2024-05-16 ENCOUNTER — OFFICE VISIT (OUTPATIENT)
Dept: FAMILY MEDICINE CLINIC | Age: 34
End: 2024-05-16

## 2024-05-16 VITALS
HEART RATE: 95 BPM | WEIGHT: 287 LBS | DIASTOLIC BLOOD PRESSURE: 70 MMHG | OXYGEN SATURATION: 100 % | SYSTOLIC BLOOD PRESSURE: 124 MMHG | BODY MASS INDEX: 50.84 KG/M2 | TEMPERATURE: 98.2 F

## 2024-05-16 DIAGNOSIS — Z76.89 RETURN TO WORK EVALUATION: Primary | ICD-10-CM

## 2024-05-16 ASSESSMENT — ENCOUNTER SYMPTOMS
NAUSEA: 0
DIARRHEA: 0
CONSTIPATION: 0
ABDOMINAL PAIN: 0

## 2024-05-16 NOTE — PROGRESS NOTES
Conjunctiva/sclera: Conjunctivae normal.   Cardiovascular:      Rate and Rhythm: Normal rate and regular rhythm.      Heart sounds: Normal heart sounds. No murmur heard.  Pulmonary:      Effort: Pulmonary effort is normal.      Breath sounds: Normal breath sounds.   Musculoskeletal:      Cervical back: Normal range of motion.   Skin:     General: Skin is warm and dry.   Neurological:      Mental Status: He is alert and oriented to person, place, and time.      Motor: No weakness or tremor.      Coordination: Coordination is intact.   Psychiatric:         Attention and Perception: Attention and perception normal.         Mood and Affect: Mood normal.         Speech: Speech normal.         Behavior: Behavior normal. Behavior is cooperative.         Thought Content: Thought content normal.         Cognition and Memory: Cognition and memory normal.         Judgment: Judgment normal.        /70 (Site: Left Lower Arm, Position: Sitting)   Pulse 95   Temp 98.2 °F (36.8 °C) (Temporal)   Wt 130.2 kg (287 lb)   SpO2 100%   BMI 50.84 kg/m²      ASSESSMENT:      ICD-10-CM    1. Return to work evaluation  Z76.89           PLAN:    Kevin Raines: Letter for School/Work (Patient presents today for f/u and to get work release letter. Patient states he is doing much better. )  Form filled out and to be faxed     Diagnosis and orders for this visit are above.

## 2024-05-23 ASSESSMENT — ENCOUNTER SYMPTOMS
COUGH: 0
ABDOMINAL DISTENTION: 0
SORE THROAT: 0
EYE PAIN: 0
SHORTNESS OF BREATH: 0
EYE REDNESS: 0
ABDOMINAL PAIN: 0
BACK PAIN: 0

## 2024-06-17 ENCOUNTER — OFFICE VISIT (OUTPATIENT)
Dept: SURGERY | Age: 34
End: 2024-06-17
Payer: COMMERCIAL

## 2024-06-17 VITALS
HEIGHT: 63 IN | OXYGEN SATURATION: 95 % | HEART RATE: 106 BPM | BODY MASS INDEX: 49.36 KG/M2 | TEMPERATURE: 98.9 F | WEIGHT: 278.6 LBS

## 2024-06-17 DIAGNOSIS — E66.09 OBESITY DUE TO EXCESS CALORIES, UNSPECIFIED CLASSIFICATION, UNSPECIFIED WHETHER SERIOUS COMORBIDITY PRESENT: ICD-10-CM

## 2024-06-17 DIAGNOSIS — K40.90 LEFT INGUINAL HERNIA: Primary | ICD-10-CM

## 2024-06-17 PROCEDURE — 99212 OFFICE O/P EST SF 10 MIN: CPT | Performed by: SURGERY

## 2024-06-17 NOTE — PROGRESS NOTES
Subjective:  Mr. Raines is here to follow up concerning left inguinal hernia. At his last appointment we discussed the need to work on weight loss to get closer to the BMI of 45. The patient reports that since then he lost about 14 pounds doing keto and intermittent fasting. He did gain back about 4 pounds after having a cheat day. He is also in the process of switching to day shift.    Objective:  Pulse (!) 106   Temp 98.9 °F (37.2 °C)   Ht 1.6 m (5' 3\")   Wt 126.4 kg (278 lb 9.6 oz)   SpO2 95%   BMI 49.35 kg/m²   General: NAD, AAO  Chest: regular, non-labored  Abdomen: Soft, obese    Assessment and plan:  33 year old male with left inguinal hernia and obesity  I discussed with the patient that he has made a good start. I recommended that he plug in with his PCP to discuss ongoing weight loss efforts, and then follow up in general surgery when he gest closer to the 250-255 valeria. The patient expressed understanding and agreement with this plan.

## 2024-09-12 ENCOUNTER — OFFICE VISIT (OUTPATIENT)
Dept: FAMILY MEDICINE CLINIC | Age: 34
End: 2024-09-12
Payer: COMMERCIAL

## 2024-09-12 VITALS
BODY MASS INDEX: 47.84 KG/M2 | HEART RATE: 92 BPM | HEIGHT: 63 IN | DIASTOLIC BLOOD PRESSURE: 78 MMHG | SYSTOLIC BLOOD PRESSURE: 110 MMHG | TEMPERATURE: 97.9 F | WEIGHT: 270 LBS | OXYGEN SATURATION: 99 %

## 2024-09-12 DIAGNOSIS — M10.472 ACUTE GOUT DUE TO OTHER SECONDARY CAUSE INVOLVING TOE OF LEFT FOOT: ICD-10-CM

## 2024-09-12 DIAGNOSIS — M10.472 ACUTE GOUT DUE TO OTHER SECONDARY CAUSE INVOLVING TOE OF LEFT FOOT: Primary | ICD-10-CM

## 2024-09-12 LAB — URATE SERPL-MCNC: 7.3 MG/DL (ref 3.4–7)

## 2024-09-12 PROCEDURE — 99213 OFFICE O/P EST LOW 20 MIN: CPT | Performed by: NURSE PRACTITIONER

## 2024-09-12 RX ORDER — METHYLPREDNISOLONE 4 MG
TABLET, DOSE PACK ORAL
Qty: 1 KIT | Refills: 0 | Status: SHIPPED | OUTPATIENT
Start: 2024-09-12 | End: 2024-09-18

## 2024-09-12 RX ORDER — ALLOPURINOL 100 MG/1
100 TABLET ORAL DAILY
Qty: 90 TABLET | Refills: 0 | Status: SHIPPED | OUTPATIENT
Start: 2024-09-12

## 2024-09-12 ASSESSMENT — ENCOUNTER SYMPTOMS
NAUSEA: 0
ABDOMINAL PAIN: 0
DIARRHEA: 0
VOMITING: 0

## 2025-01-18 DIAGNOSIS — M10.472 ACUTE GOUT DUE TO OTHER SECONDARY CAUSE INVOLVING TOE OF LEFT FOOT: ICD-10-CM

## 2025-01-20 RX ORDER — ALLOPURINOL 100 MG/1
100 TABLET ORAL DAILY
Qty: 30 TABLET | Refills: 5 | Status: SHIPPED | OUTPATIENT
Start: 2025-01-20

## 2025-01-20 NOTE — TELEPHONE ENCOUNTER
Kevin Raines called to request a refill on his medication.      Last office visit : 9/12/2024   Next office visit : Visit date not found     Requested Prescriptions     Pending Prescriptions Disp Refills    allopurinol (ZYLOPRIM) 100 MG tablet [Pharmacy Med Name: ALLOPURINOL 100 MG  Tablet] 30 tablet 5     Sig: TAKE 1 TABLET BY MOUTH DAILY            Chantelle Noel LPN

## 2025-01-20 NOTE — TELEPHONE ENCOUNTER
Pt called for refill I made him aware this was just signed so the pharmacy should be getting this ready for him

## 2025-07-01 DIAGNOSIS — M10.472 ACUTE GOUT DUE TO OTHER SECONDARY CAUSE INVOLVING TOE OF LEFT FOOT: ICD-10-CM

## 2025-07-01 RX ORDER — ALLOPURINOL 100 MG/1
100 TABLET ORAL DAILY
Qty: 30 TABLET | Refills: 0 | Status: SHIPPED | OUTPATIENT
Start: 2025-07-01

## 2025-07-01 NOTE — TELEPHONE ENCOUNTER
Kevin Raines called to request a refill on his medication.      Last office visit : 9/12/2024  Next office visit : Visit date not found     Requested Prescriptions     Pending Prescriptions Disp Refills    allopurinol (ZYLOPRIM) 100 MG tablet [Pharmacy Med Name: ALLOPURINOL 100 MG TABS 100 Tablet] 30 tablet 0     Sig: TAKE 1 TABLET BY MOUTH DAILY       Message left to call and make an appointment.      Chantelle Noel LPN

## 2025-07-04 ASSESSMENT — PATIENT HEALTH QUESTIONNAIRE - PHQ9
2. FEELING DOWN, DEPRESSED OR HOPELESS: NOT AT ALL
1. LITTLE INTEREST OR PLEASURE IN DOING THINGS: NOT AT ALL
1. LITTLE INTEREST OR PLEASURE IN DOING THINGS: NOT AT ALL
SUM OF ALL RESPONSES TO PHQ QUESTIONS 1-9: 0
2. FEELING DOWN, DEPRESSED OR HOPELESS: NOT AT ALL
SUM OF ALL RESPONSES TO PHQ QUESTIONS 1-9: 0
SUM OF ALL RESPONSES TO PHQ9 QUESTIONS 1 & 2: 0

## 2025-07-07 ENCOUNTER — OFFICE VISIT (OUTPATIENT)
Age: 35
End: 2025-07-07
Payer: COMMERCIAL

## 2025-07-07 VITALS
BODY MASS INDEX: 51.03 KG/M2 | HEART RATE: 88 BPM | SYSTOLIC BLOOD PRESSURE: 114 MMHG | HEIGHT: 63 IN | WEIGHT: 288 LBS | OXYGEN SATURATION: 97 % | TEMPERATURE: 97.3 F | DIASTOLIC BLOOD PRESSURE: 68 MMHG

## 2025-07-07 DIAGNOSIS — M10.472 ACUTE GOUT DUE TO OTHER SECONDARY CAUSE INVOLVING TOE OF LEFT FOOT: ICD-10-CM

## 2025-07-07 LAB
ALBUMIN SERPL-MCNC: 4.1 G/DL (ref 3.5–5.2)
ALP SERPL-CCNC: 81 U/L (ref 40–129)
ALT SERPL-CCNC: 14 U/L (ref 10–50)
ANION GAP SERPL CALCULATED.3IONS-SCNC: 11 MMOL/L (ref 8–16)
AST SERPL-CCNC: 12 U/L (ref 10–50)
BILIRUB SERPL-MCNC: <0.2 MG/DL (ref 0.2–1.2)
BUN SERPL-MCNC: 16 MG/DL (ref 6–20)
CALCIUM SERPL-MCNC: 9.2 MG/DL (ref 8.6–10)
CHLORIDE SERPL-SCNC: 103 MMOL/L (ref 98–107)
CO2 SERPL-SCNC: 24 MMOL/L (ref 22–29)
CREAT SERPL-MCNC: 0.9 MG/DL (ref 0.7–1.2)
GLUCOSE SERPL-MCNC: 103 MG/DL (ref 70–99)
POTASSIUM SERPL-SCNC: 4.5 MMOL/L (ref 3.5–5.1)
PROT SERPL-MCNC: 7.2 G/DL (ref 6.4–8.3)
SODIUM SERPL-SCNC: 138 MMOL/L (ref 136–145)
URATE SERPL-MCNC: 5.4 MG/DL (ref 3.4–7)

## 2025-07-07 PROCEDURE — 99213 OFFICE O/P EST LOW 20 MIN: CPT | Performed by: NURSE PRACTITIONER

## 2025-07-07 RX ORDER — ALLOPURINOL 100 MG/1
100 TABLET ORAL DAILY
Qty: 30 TABLET | Refills: 0 | Status: CANCELLED | OUTPATIENT
Start: 2025-07-07

## 2025-07-07 SDOH — ECONOMIC STABILITY: FOOD INSECURITY: WITHIN THE PAST 12 MONTHS, THE FOOD YOU BOUGHT JUST DIDN'T LAST AND YOU DIDN'T HAVE MONEY TO GET MORE.: NEVER TRUE

## 2025-07-07 SDOH — ECONOMIC STABILITY: FOOD INSECURITY: WITHIN THE PAST 12 MONTHS, YOU WORRIED THAT YOUR FOOD WOULD RUN OUT BEFORE YOU GOT MONEY TO BUY MORE.: NEVER TRUE

## 2025-07-07 ASSESSMENT — ENCOUNTER SYMPTOMS
NAUSEA: 0
TROUBLE SWALLOWING: 0
SHORTNESS OF BREATH: 0
BLOOD IN STOOL: 0
COUGH: 0
ABDOMINAL PAIN: 0

## 2025-07-07 NOTE — PROGRESS NOTES
[facility]  [address]  [address]  [place] [address]  Dept: [phone]  Dept Fax: [phone]       Patient ID: Kevin Raines is a 34 y.o. male.    ASSESSMENT    Assessment & Plan  1. Gout.  - Reports occasional flare-ups lasting one to two days, primarily affecting the left toe.  - Has been on allopurinol since 09/2024.  - Blood test will be conducted to assess uric acid levels.  - If levels are elevated, dosage of allopurinol may be increased to two tablets daily; advised to take medication with food to avoid nausea.    2. Weight management.  - Has experienced some weight gain.  - Blood pressure and heart rate are within normal limits.  - Advised to work on weight management to prevent future complications.  - Blood sugar test will be performed to monitor for any potential issues related to weight gain.         ICD-10-CM    1. Acute gout due to other secondary cause involving toe of left foot  M10.472           PLAN    Kevin Raines: Medication Refill (Patient is here to have allopurinol refilled./)      Diagnosis and orders for thisvisit are above.     All patient questions answered. Pt voiced understanding. Reviewedhealth maintenance. Instructed to continue current medications, diet and exercise. Patient agreed with treatment plan. Follow up as directed.      SUBJECTIVE    HPI    History of Present Illness  The patient presents today for medication refills.    He reports overall good health, with the exception of occasional gout flare-ups that last a day or two. He has been on allopurinol since approximately 09/2024, which he finds beneficial. He experiences mild pain in his left toe after work, but it subsides after sleep. This occurs sporadically throughout the year and is not a constant issue. He reports no fevers, cough, shortness of breath, chest pain, palpitations, urinary issues, blood in urine, dizziness,

## 2025-07-08 ENCOUNTER — RESULTS FOLLOW-UP (OUTPATIENT)
Age: 35
End: 2025-07-08